# Patient Record
Sex: FEMALE | Race: WHITE | URBAN - METROPOLITAN AREA
[De-identification: names, ages, dates, MRNs, and addresses within clinical notes are randomized per-mention and may not be internally consistent; named-entity substitution may affect disease eponyms.]

---

## 2017-11-02 ENCOUNTER — TELEPHONE (OUTPATIENT)
Dept: FAMILY MEDICINE | Facility: CLINIC | Age: 57
End: 2017-11-02

## 2017-11-02 NOTE — TELEPHONE ENCOUNTER
Joelle House is a 57 year old female who calls with intermittent abdominal pain.    NURSING ASSESSMENT:  Description:  Pt has had a few incidents of lower abdominal pain.  The pain is mild now but has been pretty intense.  Onset/duration:  Monday   Precip. factors:  unknown  Associated symptoms:  Low abdominal pain in the center, no appetitie.  Denies vomiting, light-headedness, bloody or black stools, back pain, shoulder pain, chest pain, difficulty breathing, fever, urinary symptoms.  Pt had a BM a few minutes ago which normal for her.  Improves/worsens symptoms:  It doesn't seem like there is anything pt does that makes the pain come on or go away  Pain scale (0-10)   4/10    Allergies:   Allergies   Allergen Reactions     Nkda [No Known Drug Allergies]        RECOMMENDED DISPOSITION:  See in 24 hours - Pt would like to just eat bland foods and rest.  She will go to the ER if pain gets bad again and will call to schedule an appointment if the mild pain doesn't improve.  Will comply with recommendation: Yes  If further questions/concerns or if symptoms do not improve, worsen or new symptoms develop, call your PCP or [facility] as soon as possible.      Guideline used: abdominal pain  Telephone Triage Protocols for Nurses, Fifth Edition, Regina Ruiz RN

## 2019-12-08 ENCOUNTER — HEALTH MAINTENANCE LETTER (OUTPATIENT)
Age: 59
End: 2019-12-08

## 2020-03-15 ENCOUNTER — HEALTH MAINTENANCE LETTER (OUTPATIENT)
Age: 60
End: 2020-03-15

## 2021-01-10 ENCOUNTER — HEALTH MAINTENANCE LETTER (OUTPATIENT)
Age: 61
End: 2021-01-10

## 2021-10-23 ENCOUNTER — HEALTH MAINTENANCE LETTER (OUTPATIENT)
Age: 61
End: 2021-10-23

## 2022-02-12 ENCOUNTER — HEALTH MAINTENANCE LETTER (OUTPATIENT)
Age: 62
End: 2022-02-12

## 2022-04-09 ENCOUNTER — HEALTH MAINTENANCE LETTER (OUTPATIENT)
Age: 62
End: 2022-04-09

## 2022-10-09 ENCOUNTER — HEALTH MAINTENANCE LETTER (OUTPATIENT)
Age: 62
End: 2022-10-09

## 2023-02-18 ENCOUNTER — HEALTH MAINTENANCE LETTER (OUTPATIENT)
Age: 63
End: 2023-02-18

## 2024-03-16 ENCOUNTER — HEALTH MAINTENANCE LETTER (OUTPATIENT)
Age: 64
End: 2024-03-16